# Patient Record
Sex: MALE
[De-identification: names, ages, dates, MRNs, and addresses within clinical notes are randomized per-mention and may not be internally consistent; named-entity substitution may affect disease eponyms.]

---

## 2018-12-05 ENCOUNTER — HOSPITAL ENCOUNTER (OUTPATIENT)
Dept: HOSPITAL 25 - OREAST | Age: 23
Discharge: HOME | End: 2018-12-05
Attending: ORTHOPAEDIC SURGERY
Payer: COMMERCIAL

## 2018-12-05 VITALS — DIASTOLIC BLOOD PRESSURE: 72 MMHG | SYSTOLIC BLOOD PRESSURE: 130 MMHG
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
## 2018-12-05 DIAGNOSIS — S62.610A: Primary | ICD-10-CM
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
## 2018-12-05 PROCEDURE — C1776 JOINT DEVICE (IMPLANTABLE): HCPCS

## 2018-12-05 PROCEDURE — 76000 FLUOROSCOPY <1 HR PHYS/QHP: CPT

## 2018-12-06 NOTE — OP
DATE OF OPERATION:  12/05/18 - [facility]

 

DATE OF BIRTH:  [birth year]

 

SURGEON:  Owen Rodriguez MD

 

ASSISTANT:  SWETA Mcmahan.  An assistant was needed for the entirety of 
the procedure to aid in positioning of the arm and retraction.

 

ANESTHESIOLOGIST:  Dr. Castellano.

 

ANESTHESIA:  General.

 

PRE-OP DIAGNOSIS:  Right proximal phalanx intraarticular ulnar collateral 
ligament avulsion fracture.

 

POST-OP DIAGNOSIS:  Right proximal phalanx intraarticular ulnar collateral 
ligament avulsion fracture.

 

OPERATIVE PROCEDURE:  Open reduction internal fixation of right proximal 
phalanx intraarticular base ulnar collateral ligament avulsion fracture with 
Synthes 1.5-mm variable angle handset cortical screw.

 

INDICATIONS:  Marcelino had an ulnar collateral ligament avulsion fracture of the 
base of the proximal phalanx.  It was very displaced and rotated more than 90 
degrees. I talked about the risks and benefits.  He is a  for 
the [employer] and he wanted to get back to playing basketball as soon as 
possible. The fracture was extremely displaced and so we decided to proceed 
with surgery.

 

ESTIMATED BLOOD LOSS:  2 mL.

 

COMPLICATIONS:  None.

 

FINDINGS:  See above and below.

 

DESCRIPTION OF PROCEDURE:  Marcelino was seen in the preoperative holding area.  The 
correct side, site, and procedure were identified.  We came back to the 
operating room.  The arm was prepped and draped in the usual fashion.  A time-
out was performed.

 

The arm was exsanguinated with the Esmarch and the tourniquet inflated to 250 
mmHg.  I then made a curvilinear incision over the dorsal aspect of the MCP 
joint curving down in between the first and second MCP joints.  Full-thickness 
flap was raised off the extensor finney of the first MCP joint.  I released the 
ulnar sagittal band and some of the oblique fibers of the intrinsic.  This 
provided me access to the ulnar side of the joint.  The fracture fragment was 
encountered.  

It was rotated well past 90 degrees with the articular surface facing the 
fracture site of the distal aspect of the proximal phalanx.  I went ahead and 
cleaned up the fracture just with Coal blade and micro curette.  I got rid of 
all the fracture hematoma and the soft tissue that was interposed.  I then used 
a dental pick and point of reduction clamp of the Synthes variable angle 
handset to reduce the fragment. I pinned it in place with couple of 0.8 K-
wires. It was very small fragment.  I thought, ultimately, it was going to be 
very difficult to get the fracture fixed by beginning the screw from the ulnar 
aspect and so I came over to the radial aspect and I made 1-cm longitudinal 
incision in the mid axial line.  I then carried down the dissection and I 
mobilized the extensor tendon dorsally and then released some of the 
periosteum.  I then took the 1.0-mm drill bit and created a drill hole under 
fluoroscopic guidance exiting out the small fragment on the ulnar aspect of the 
metacarpal base.  I then measured and selected the longest 1.3-mm screw, which 
was an 18.  I then placed the screw and unfortunately it was just little too 
short, maybe 2 mm too short.  I examined things from the radial side.  I noted 
that the reduction had been lost just a little bit.  Ultimately, I decided that 
I wanted to try to see if I get a screw from the ulnar side and so I came back 
over to the ulnar side.  I had the fragment clamped into place.  I selected 1.0-
mm drill bit and I was able to manipulate the hand and the finger in such a way 
that I was able to get an angle where I get to drill a 1.0-mm drill hole from 
the central portion of the fragment right near the margin of the articular 
cartilage ulnarly and out radially and palmarly.  I measured initially so I did 
a 20-mm screw.  This was placed, it was too long, so I back it out, remeasured 
and then placed a 15-mm screw, which had excellent bite, provided nice 
compression and held very nicely through the subchondral bone.  I checked the 
fluoroscopic imaging and the piece was anatomically reduced.  The fixation was 
very nice.  I went ahead and removed the 1.3-mm screw that I placed earlier.  I 
debated whether or not to try to place a second screw, but ultimately it was 
holding so nicely and it was so nicely reduced with the one screw that I left 
it just like that.

 

With the articular surface repaired and the ulnar collateral ligament repaired 
together with bony fragment, I went ahead and irrigated out the wound.  The 
capsule was closed with 5-0 Prolene.  The split in the ulnar sagittal band and 
the extensor tendon was repaired with 5-0 Prolene.  The skin on both wounds was 
closed with 4-0 nylon suture.  Some Marcaine had already been infiltrated 
proximally at the beginning of the case.  I infiltrated a little bit more in 
the operative area.  The wound was dressed with Xeroform, 4x4s, sterile Webril, 
and a plaster splint was applied holding the index and middle fingers and 
wrapping radially around the index finger to prevent any stress on the ulnar 
collateral ligament.  Tourniquet was deflated and the finger pinked up 
immediately.  He was taken to the recovery room in stable condition.

 

 [id number]/CPS #: [id number]

SCOTT